# Patient Record
(demographics unavailable — no encounter records)

---

## 2024-11-16 NOTE — HISTORY OF PRESENT ILLNESS
[de-identified] : cough [FreeTextEntry6] : - Coughing started over the weekend, persisted and worsened by Monday - Over-the-counter medications (Pain and fever medication, Zyrtec) were ineffective - Urgent care visit on Thursday, prescribed albuterol and nebulizer treatment, may have helped a little in the moment but no significant difference, No history of asthma, made no difference, used again this morning - Coughing worsens when lying down, causing difficulty sleeping - No fever reported - Appetite normal, but recently got braces which may affect eating - Sister had a cough but has since recovered - Chest pain associated with coughing - No vomiting, diarrhea, or belly pain reported - No runny nose or congestion, slight sneezing - up to date on vaccinations including COVID-19 - Coughing is a recurring issue, usually around [place] - No relief from over-the-counter cough suppressants, including Robitussin - Morning routine includes steam shower, which may provide temporary rellief  Family history Death of a relative at 21 due to unidentified respiratory illness, per mom this was "walking pneumonia"

## 2024-11-16 NOTE — DISCUSSION/SUMMARY
[FreeTextEntry1] : Assessment Persistent cough, possibly due to a viral infection or Mycoplasma pneumonia  Plan - Await results of RVP respiratory panel - Continue rest, adequate fluid intake, and non-prescription antitussives - Possible antibiotics if Mycoplasma is confirmed, otherwise no tx necessary - Over-the-counter cough suppressants ok

## 2025-03-20 NOTE — PHYSICAL EXAM

## 2025-03-20 NOTE — RISK ASSESSMENT
[0] : 2) Feeling down, depressed, or hopeless: Not at all (0) [PHQ-2 Negative - No further assessment needed] : PHQ-2 Negative - No further assessment needed [Have you ever had exercise-related chest pain or shortness of breath?] : Have you ever had exercise-related chest pain or shortness of breath? Yes [Increased risk of SCA or SCD] : Increased risk of SCA or SCD  [SQT5Npxmm] : 0 [Have you ever fainted, passed out or had an unexplained seizure suddenly and without warning, especially during exercise or in response] : Have you ever fainted, passed out or had an unexplained seizure suddenly and without warning, especially during exercise or in response to sudden loud noises such as doorbells, alarm clocks and ringing telephones? No [Has anyone in your immediate family (parents, grandparents, siblings) or other more distant relatives (aunts, uncles, cousins)  of heart] : Has anyone in your immediate family (parents, grandparents, siblings) or other more distant relatives (aunts, uncles, cousins)  of heart problems or had an unexpected sudden death before age 50 (This would include unexpected drownings, unexplained car accidents in which the relative was driving or sudden infant death syndrome.)? No [Are you related to anyone with hypertrophic cardiomyopathy or hypertrophic obstructive cardiomyopathy, Marfan syndrome, arrhythmogenic] : Are you related to anyone with hypertrophic cardiomyopathy or hypertrophic obstructive cardiomyopathy, Marfan syndrome, arrhythmogenic right ventricular cardiomyopathy, long QT syndrome, short QT syndrome, Brugada syndrome or catecholaminergic polymorphic ventricular tachycardia, or anyone younger than 50 years with a pacemaker or implantable defibrillator? No

## 2025-03-20 NOTE — HISTORY OF PRESENT ILLNESS
[Mother] : mother [Yes] : Patient goes to dentist yearly [Uses safety belts/safety equipment] : uses safety belts/safety equipment  [No] : Patient has not had sexual intercourse [Has ways to cope with stress] : has ways to cope with stress [NO] : No [Uses electronic nicotine delivery system] : does not use electronic nicotine delivery system [Uses tobacco] : does not use tobacco [Uses drugs] : does not use drugs  [Drinks alcohol] : does not drink alcohol [Gets depressed, anxious, or irritable/has mood swings] : does not get depressed, anxious, or irritable/has mood swings [de-identified] : Interested in females. [FreeTextEntry1] :  Family hx of sudden death - saw cardiology 4/2024. Found to have innocent murmur; normal echo. EKG with LVH but likely false positive.  Sometimes has cramps in the R leg. mom does note that sometimes he sits on his bed with his laptop   8th grade.   Performs with [employer] - has been able to travel internationally and perform at [employer].  Will be going to [email or web address] and technical in [facility]. Grades are average.  Appetite good - varied diet. Urinating well.  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  Takes MVI  Lives with parents

## 2025-03-20 NOTE — DISCUSSION/SUMMARY
[Normal Growth] : growth [Normal Development] : development  [No Elimination Concerns] : elimination [Continue Regimen] : feeding [No Skin Concerns] : skin [Normal Sleep Pattern] : sleep [None] : no medical problems [Anticipatory Guidance Given] : Anticipatory guidance addressed as per the history of present illness section [Physical Growth and Development] : physical growth and development [Social and Academic Competence] : social and academic competence [Emotional Well-Being] : emotional well-being [Risk Reduction] : risk reduction [Violence and Injury Prevention] : violence and injury prevention [No Vaccines] : no vaccines needed [No Medications] : ~He/She~ is not on any medications [Patient] : patient [Parent/Guardian] : Parent/Guardian [FreeTextEntry1] : Elmer is a 12 y/o with ADHD (no meds) here for WCC. No acute medical concerns.  Growing appropriately. HEADDS negative. - Flu vaccine today - Anticipatory guidance as above - Discussed reducing screen time  RTC in 1 year or sooner prn.